# Patient Record
Sex: FEMALE | Race: WHITE | NOT HISPANIC OR LATINO | Employment: FULL TIME | URBAN - METROPOLITAN AREA
[De-identification: names, ages, dates, MRNs, and addresses within clinical notes are randomized per-mention and may not be internally consistent; named-entity substitution may affect disease eponyms.]

---

## 2017-01-03 ENCOUNTER — TELEPHONE (OUTPATIENT)
Dept: OBSTETRICS AND GYNECOLOGY | Facility: CLINIC | Age: 62
End: 2017-01-03

## 2017-01-03 NOTE — TELEPHONE ENCOUNTER
----- Message from Jany Will sent at 1/3/2017  9:43 AM EST -----  Contact: [phone]  PT CALLED [employer] ADV WAS GIVEN A DISCOUNT CARD FOR [place] - CARD  ON  DO WE BY CHANCE HAVE OTHER DISCOUNT CARDS READY TO BE USED ?      [phone] patient was requesting a new savings card for [place] because her old one  on 2016. Advised patient we do not have any savings cards or samples but she could probably go online and get one.

## 2018-12-08 ENCOUNTER — LAB REQUISITION (OUTPATIENT)
Dept: LAB | Facility: HOSPITAL | Age: 63
End: 2018-12-08

## 2018-12-08 DIAGNOSIS — Z00.00 ROUTINE GENERAL MEDICAL EXAMINATION AT A HEALTH CARE FACILITY: ICD-10-CM

## 2018-12-08 PROCEDURE — 36415 COLL VENOUS BLD VENIPUNCTURE: CPT

## 2019-10-01 ENCOUNTER — CONSULT (OUTPATIENT)
Dept: SLEEP MEDICINE | Facility: HOSPITAL | Age: 64
End: 2019-10-01

## 2019-10-01 VITALS
SYSTOLIC BLOOD PRESSURE: 145 MMHG | DIASTOLIC BLOOD PRESSURE: 67 MMHG | BODY MASS INDEX: 32.62 KG/M2 | HEART RATE: 88 BPM | WEIGHT: 195.8 LBS | OXYGEN SATURATION: 96 % | HEIGHT: 65 IN

## 2019-10-01 DIAGNOSIS — R06.83 SNORING: ICD-10-CM

## 2019-10-01 DIAGNOSIS — G47.10 HYPERSOMNOLENCE: Primary | ICD-10-CM

## 2019-10-01 DIAGNOSIS — E66.9 OBESITY (BMI 30.0-34.9): ICD-10-CM

## 2019-10-01 DIAGNOSIS — G25.81 RESTLESS LEGS SYNDROME (RLS): ICD-10-CM

## 2019-10-01 PROCEDURE — 99204 OFFICE O/P NEW MOD 45 MIN: CPT | Performed by: INTERNAL MEDICINE

## 2019-10-01 RX ORDER — PRAMIPEXOLE DIHYDROCHLORIDE 0.12 MG/1
0.12 TABLET ORAL NIGHTLY
Qty: 30 TABLET | Refills: 2 | Status: SHIPPED | OUTPATIENT
Start: 2019-10-01 | End: 2020-01-13

## 2019-10-01 RX ORDER — METFORMIN HYDROCHLORIDE 500 MG/1
1000 TABLET, EXTENDED RELEASE ORAL 2 TIMES DAILY
COMMUNITY

## 2019-10-01 RX ORDER — LEVOTHYROXINE, LIOTHYRONINE 9.5; 2.25 UG/1; UG/1
TABLET ORAL
COMMUNITY
Start: 2019-08-16 | End: 2020-01-13

## 2019-10-01 RX ORDER — ROSUVASTATIN CALCIUM 20 MG/1
TABLET, COATED ORAL
COMMUNITY

## 2019-10-01 NOTE — PROGRESS NOTES
New Sleep Patient Office Visit      Patient Name: Ian Humphrey    Referring Physician: Virginia Schuster*    Chief Complaint:    Chief Complaint   Patient presents with   • Sleeping Problem       History of Present Illness: Ian Humphrey is a 63 y.o. female who is here today to establish care with Sleep Medicine.     63-year-old female with past medical history of hypertension, diabetes, obesity, dyslipidemia and recent diagnosis of hypothyroidism presenting with complains of restless legs and difficulty sleeping at night resulting in significant daytime fatigue and sleepiness.  Patient states that she has problem with restless legs since childhood and over the years she has managed to live with that and managed to move her legs around at night so that she does not feel the impact of it but that makes her  sleep difficult and she is looking for more options now.  She has had participated in research studies for restless legs and was noted to have low ferritin levels many years ago and over the last 15 to 20 years she has been on and off iron supplementation where her ferritin levels come up above 60 but she does not notice any change in her restless leg symptoms however.  She states it bothers her pretty much every evening worse at bedtime.  She does not do much in form of exercise or stretching but we advised her for that.  Advised may be taking a warm shower at bedtime or do some lower extremity stretching to see if that will make a difference in her symptoms.  Recently apparently her ferritin level was checked and it was over 60.  She will get us the reports.    Patient does snore loudly and sometimes wake up with dry mouth or headaches.  She does have excessive sleepiness with [place] score of 19.  She stated she feels extremely sleepy and tired during the daytime and sometimes she will find herself dozing at work.  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  Denies  any driving problems or driving accidents or sleeping on the wheel or near miss accidents.  But if she is a passenger in the car she gets bothered with sleep as well as restless legs.  She avoids airplane rides because of these reasons.    Patient states that she has no energy during the daytime so she is unable to do any workout or exercise.  As a result she keeps on gaining weight and has gained about 15 pounds in the last year.  Recently she was found to have low thyroid function so she was placed on thyroid medication in the hope that will improve her energy level but so far she has not seen any significant improvement.  Patient denies any leg edema or leg pain or any neuropathic pain either.    Further sleep history is as below.    [place] Scale: 19/24    Estimated average amount of sleep per night: 7  Number of times  she wakes up at night: 3  Difficulty falling back asleep: no  It usually takes 60 minutes to go to sleep.  She feels sleepy upon waking up: yes  Rotating or night shift work: no    Drowsiness/Sleepiness:  She exhibits the following:  excessive daytime sleepiness  excessive daytime fatigue  falls asleep during times of the day when she is quiet  sleepy even while on vacation  sleepy even when sleep time is increased    Snoring/Breathing:  She exhibits the following:  loud snoring  awoken with dry mouth  awakens gasping for breath  sore throat when waking up in the morning  trouble breathing through nose at night  morning headaches    Reflux:  She describes the following:  wakes up at night with a sour taste or burning sensation in chest    Narcolepsy:  She exhibits the following:  none    RLS/PLMs:  She describes the following:  moves or jerks during sleep  discomfort in legs with an urge to move them    Insomnia:  She describes the following:  frequent awakenings  restless sleep    Parasomnia:  She exhibits the following:  sleep talks  excessive sweating while sleeping  grinds  teeth    Weight:  Weight change in the last year:  gain: 15 lbs    Subjective      Review of Systems:   Review of Systems   Constitutional: Positive for fatigue and unexpected weight gain.   HENT: Positive for ear pain and postnasal drip.    Respiratory: Negative.    Cardiovascular: Negative.    Gastrointestinal: Negative.    Endocrine: Negative.    Musculoskeletal: Positive for back pain and myalgias.   Skin: Positive for dry skin.   Neurological: Positive for headache.   Hematological: Negative.    Psychiatric/Behavioral: Negative.  Positive for depressed mood.   All other systems reviewed and are negative.      Past Medical History:   Past Medical History:   Diagnosis Date   • Arthritis    • Diabetes mellitus (CMS/HCC)    • Heart burn    • Hyperlipidemia    • Hypertension        Past Surgical History:   Past Surgical History:   Procedure Laterality Date   • EYE SURGERY      CATARACTS    • TONSILLECTOMY     • TUBAL ABDOMINAL LIGATION         Family History:   Family History   Problem Relation Age of Onset   • Coronary artery disease Mother    • Stroke Mother    • Diabetes Mother    • Heart disease Mother    • Hypertension Mother    • Obesity Mother    • Thyroid disease Mother    • Coronary artery disease Father    • Heart disease Father    • Hypertension Father    • Coronary artery disease Maternal Grandfather    • Breast cancer Paternal Grandmother         POSTMENOPAUSAL    • Diabetes Brother    • Coronary artery disease Brother    • Heart disease Brother        Social History:   Social History     Socioeconomic History   • Marital status: Single     Spouse name: Not on file   • Number of children: Not on file   • Years of education: Not on file   • Highest education level: Not on file   Tobacco Use   • Smoking status: Never Smoker   • Smokeless tobacco: Never Used   Substance and Sexual Activity   • Alcohol use: Yes     Comment: OCCAS   • Drug use: No   • Sexual activity: Not Currently     Birth  "control/protection: Post-menopausal       Medications:     Current Outpatient Medications:   •  Cholecalciferol (VITAMIN D3) 5000 UNITS capsule capsule, Take 5,000 Units by mouth Daily., Disp: , Rfl:   •  losartan-hydrochlorothiazide (HYZAAR) 100-25 MG per tablet, Take 1 tablet by mouth Daily., Disp: , Rfl:   •  MAGNESIUM PO, Take  by mouth., Disp: , Rfl:   •  melatonin 1 MG tablet, Take 5 mg by mouth., Disp: , Rfl:   •  metFORMIN ER (GLUCOPHAGE-XR) 500 MG 24 hr tablet, metformin  mg tablet,extended release 24 hr, Disp: , Rfl:   •  Misc Natural Products (FIBER 7 PO), Take  by mouth., Disp: , Rfl:   •  Multiple Vitamins-Minerals (MULTIVITAMIN ADULT PO), Take  by mouth., Disp: , Rfl:   •  NP THYROID 15 MG tablet, , Disp: , Rfl:   •  rosuvastatin (CRESTOR) 20 MG tablet, rosuvastatin 20 mg tablet, Disp: , Rfl:   •  pramipexole (MIRAPEX) 0.125 MG tablet, Take 1 tablet by mouth Every Night., Disp: 30 tablet, Rfl: 2    Allergies:   Allergies   Allergen Reactions   • Sulfa Antibiotics    • Tetracyclines & Related        Objective     Physical Exam:  Vital Signs:   Vitals:    10/01/19 1024   BP: 145/67   Pulse: 88   SpO2: 96%   Weight: 88.8 kg (195 lb 12.8 oz)   Height: 165.1 cm (65\")       Physical Exam   Constitutional: She is oriented to person, place, and time. She appears well-developed and well-nourished. No distress.   HENT:   Head: Normocephalic and atraumatic.   Nose: Nose normal.   Mouth/Throat: Oropharynx is clear and moist. No oropharyngeal exudate.   Thrush: None  Mallampati Score: 3, redundant soft palate    Eyes: EOM are normal. Pupils are equal, round, and reactive to light. Right eye exhibits no discharge. Left eye exhibits no discharge. No scleral icterus.   Neck: Neck supple. No tracheal deviation present. No thyromegaly present.   Cardiovascular: Normal rate, regular rhythm and normal heart sounds. Exam reveals no gallop and no friction rub.   No murmur heard.  Pulmonary/Chest: Effort normal and " breath sounds normal. No stridor. No respiratory distress. She has no wheezes. She has no rales.   Abdominal: Soft. She exhibits no distension. There is no tenderness.   Musculoskeletal: She exhibits no edema or tenderness.   Clubbing: none     Lymphadenopathy:     She has no cervical adenopathy.   Neurological: She is alert and oriented to person, place, and time. No cranial nerve deficit. Coordination normal.   Skin: She is not diaphoretic.   Psychiatric: She has a normal mood and affect. Her behavior is normal. Judgment and thought content normal.   Nursing note and vitals reviewed.      Results Review:   None    Assessment / Plan      Assessment:   Problem List Items Addressed This Visit     None      Visit Diagnoses     Hypersomnolence    -  Primary    Relevant Orders    Home Sleep Study    Snoring        Obesity (BMI 30.0-34.9)        Restless legs syndrome (RLS)            Plan:     1.  Patient with Significant obesity, upper airway abnormalities and postmenopausal status, concerning for underlying sleep disordered breathing with obstructive sleep apnea.  Discussed the pathophysiology of sleep apnea, side effects of untreated sleep apnea reviewed.  Treatment options reviewed as well.  Patient's  apparently has sleep apnea and was on CPAP therapy before.  We will go ahead and get a sleep study done on her.  She does get decent amount of sleep at night and hopefully we can capture the severity of her sleep apnea with home sleep study.  May consider watch Pat study to get a better idea of how much she is sleeping.  Discussed that sometimes home sleep study can miss mild sleep apnea with that case we may need to bring her in the lab to do further testing.  Patient denies any other REM behavior disorder or other phenomena which necessitates an lab study to begin with.    2.  Patient does have significant restless leg symptoms affecting her quality of life.  We discussed conservative measures to try to help  improve the restlessness in legs.  Iron stores will need to be kept above 75 ferritin level.  We discussed medications to try to treat the restless legs and did recommend pramipexole half an hour to 1 hour before bedtime at 0.125 mg dose.  Side effects of disinhibition and reckless behavior and sleep attacks reviewed with her.  If things do not improve will consider increasing the dose but will start at low-dose to begin with.  She is comfortable and agreeable with this plan.    3.  Increasing activity and weight loss counseled and its impact on underlying sleep disorders reviewed with her.  She verbalized understanding.    We will follow her after sleep study to go over the results and discuss further management options.  Thank you for allowing us to participate in the care of this patient.      Follow Up:   Follow-up after sleep study    Discussed plan of care in detail with patient today. Patient verbally understands and agrees.       Mahad Wu MD  Pulmonary Critical Care and Sleep Medicine      Please note that portions of this note may have been completed with a voice recognition program. Efforts were made to edit the dictations, but occasionally words are mistranscribed.

## 2019-10-28 ENCOUNTER — TRANSCRIBE ORDERS (OUTPATIENT)
Dept: PULMONOLOGY | Facility: CLINIC | Age: 64
End: 2019-10-28

## 2019-10-30 ENCOUNTER — HOSPITAL ENCOUNTER (OUTPATIENT)
Dept: SLEEP MEDICINE | Facility: HOSPITAL | Age: 64
Discharge: HOME | End: 2019-10-30
Admitting: INTERNAL MEDICINE

## 2019-10-30 VITALS
OXYGEN SATURATION: 97 % | DIASTOLIC BLOOD PRESSURE: 68 MMHG | HEIGHT: 65 IN | HEART RATE: 86 BPM | WEIGHT: 196.6 LBS | BODY MASS INDEX: 32.76 KG/M2 | SYSTOLIC BLOOD PRESSURE: 155 MMHG

## 2019-10-30 DIAGNOSIS — G47.10 HYPERSOMNOLENCE: ICD-10-CM

## 2019-10-30 PROCEDURE — 95800 SLP STDY UNATTENDED: CPT | Performed by: INTERNAL MEDICINE

## 2019-10-30 PROCEDURE — 95800 SLP STDY UNATTENDED: CPT

## 2019-10-31 DIAGNOSIS — R06.83 SNORING: ICD-10-CM

## 2019-10-31 DIAGNOSIS — G47.33 OSA (OBSTRUCTIVE SLEEP APNEA): Primary | ICD-10-CM

## 2019-10-31 DIAGNOSIS — E66.9 OBESITY (BMI 30.0-34.9): ICD-10-CM

## 2019-10-31 DIAGNOSIS — G47.10 HYPERSOMNOLENCE: ICD-10-CM

## 2019-10-31 RX ORDER — METOPROLOL TARTRATE 50 MG/1
50 TABLET, FILM COATED ORAL DAILY
COMMUNITY
End: 2020-01-13

## 2019-11-06 ENCOUNTER — OFFICE VISIT (OUTPATIENT)
Dept: SLEEP MEDICINE | Facility: HOSPITAL | Age: 64
End: 2019-11-06

## 2019-11-06 VITALS
HEIGHT: 65 IN | HEART RATE: 98 BPM | SYSTOLIC BLOOD PRESSURE: 149 MMHG | DIASTOLIC BLOOD PRESSURE: 62 MMHG | WEIGHT: 195 LBS | BODY MASS INDEX: 32.49 KG/M2 | OXYGEN SATURATION: 96 %

## 2019-11-06 DIAGNOSIS — G47.33 OSA (OBSTRUCTIVE SLEEP APNEA): Primary | ICD-10-CM

## 2019-11-06 PROCEDURE — 99213 OFFICE O/P EST LOW 20 MIN: CPT | Performed by: NURSE PRACTITIONER

## 2019-11-06 RX ORDER — BLOOD SUGAR DIAGNOSTIC
STRIP MISCELLANEOUS
COMMUNITY
Start: 2019-11-02

## 2019-11-06 RX ORDER — LANCETS
EACH MISCELLANEOUS
COMMUNITY
Start: 2019-10-20

## 2019-11-06 NOTE — PROGRESS NOTES
Chief Complaint:   Chief Complaint   Patient presents with   • Follow-up       HPI:    Ian Humphrey is a 63 y.o. female here for follow-up of sleep study results.  63-year-old female with past medical history of hypertension, diabetes, obesity, dyslipidemia and recent diagnosis of hypothyroidism presented with complains of restless legs and difficulty sleeping at night resulting in significant daytime fatigue and sleepiness.  Patient stated that she has problem with restless legs since childhood and over the years she has managed to live with that and managed to move her legs around at night so that she does not feel the impact of it but that makes her  sleep difficult and she is looking for more options now.  She has had participated in research studies for restless legs and was noted to have low ferritin levels many years ago and over the last 15 to 20 years she has been on and off iron supplementation during the daytime  Current Outpatient Medications:   •  ACCU-CHEK NELSON PLUS test strip, , Disp: , Rfl:   •  ACCU-CHEK SOFTCLIX LANCETS lancets, , Disp: , Rfl:   •  Cholecalciferol (VITAMIN D3) 5000 UNITS capsule capsule, Take 5,000 Units by mouth Daily., Disp: , Rfl:   •  losartan-hydrochlorothiazide (HYZAAR) 100-25 MG per tablet, Take 1 tablet by mouth Daily., Disp: , Rfl:   •  MAGNESIUM PO, Take  by mouth., Disp: , Rfl:   •  melatonin 1 MG tablet, Take 5 mg by mouth., Disp: , Rfl:   •  metFORMIN ER (GLUCOPHAGE-XR) 500 MG 24 hr tablet, metformin  mg tablet,extended release 24 hr, Disp: , Rfl:   •  metoprolol tartrate (LOPRESSOR) 50 MG tablet, Take 50 mg by mouth Daily., Disp: , Rfl:   •  Misc Natural Products (FIBER 7 PO), Take  by mouth., Disp: , Rfl:   •  Multiple Vitamins-Minerals (MULTIVITAMIN ADULT PO), Take  by mouth., Disp: , Rfl:   •  NP THYROID 15 MG tablet, , Disp: , Rfl:   •  pramipexole (MIRAPEX) 0.125 MG tablet, Take 1 tablet by mouth Every Night., Disp: 30 tablet, Rfl: 2  •   rosuvastatin (CRESTOR) 20 MG tablet, rosuvastatin 20 mg tablet, Disp: , Rfl: .      The patient's relevant past medical, surgical, family and social history were reviewed and updated in Epic as appropriate.       Review of Systems   Constitutional: Positive for fatigue.   HENT: Positive for postnasal drip.    Musculoskeletal: Positive for arthralgias, back pain and myalgias.   Neurological: Positive for headaches.   Psychiatric/Behavioral: Positive for dysphoric mood and sleep disturbance.         Objective:    Physical Exam   Constitutional: She is oriented to person, place, and time. She appears well-developed and well-nourished.   HENT:   Head: Normocephalic and atraumatic.   Mouth/Throat: Oropharynx is clear and moist.   Mallampati 3 anatomy   Eyes: Conjunctivae are normal.   Neck: Neck supple. No thyromegaly present.   Cardiovascular: Normal rate and regular rhythm.   Pulmonary/Chest: Effort normal and breath sounds normal.   Lymphadenopathy:     She has no cervical adenopathy.   Neurological: She is alert and oriented to person, place, and time.   Skin: Skin is warm and dry.   Psychiatric: She has a normal mood and affect. Her behavior is normal. Judgment and thought content normal.   Nursing note and vitals reviewed.  [place] 19/24.  Sleep study 10/30/2019 shows moderate sleep apnea increasing to severe when supine.  Patient wishes to initiate CPAP therapy.    ASSESSMENT/PLAN    Ian was seen today for follow-up.    Diagnoses and all orders for this visit:    SUKH (obstructive sleep apnea)            1. Counseled patient regarding multimodal approach with healthy nutrition, healthy sleep, regular physical activity, social activities, counseling, and medications. Encouraged to practice lateral sleep position. Avoid alcohol and sedatives close to bedtime.  2. Understands consequences of untreated sleep apnea and does wish to initiate CPAP therapy.  I will see patient back in 31 to 90 days.    I have reviewed  the results of my evaluation and impression and discussed my recommendations in detail with the patient.      Signed by  Kami Young, APRN    November 6, 2019      CC: Laci Waters MD          No ref. provider found

## 2020-01-13 ENCOUNTER — OFFICE VISIT (OUTPATIENT)
Dept: SLEEP MEDICINE | Facility: HOSPITAL | Age: 65
End: 2020-01-13

## 2020-01-13 VITALS
SYSTOLIC BLOOD PRESSURE: 157 MMHG | DIASTOLIC BLOOD PRESSURE: 70 MMHG | OXYGEN SATURATION: 96 % | WEIGHT: 188.2 LBS | BODY MASS INDEX: 31.36 KG/M2 | HEART RATE: 96 BPM | HEIGHT: 65 IN

## 2020-01-13 DIAGNOSIS — G47.33 OSA (OBSTRUCTIVE SLEEP APNEA): Primary | ICD-10-CM

## 2020-01-13 PROCEDURE — 99212 OFFICE O/P EST SF 10 MIN: CPT | Performed by: NURSE PRACTITIONER

## 2020-01-13 NOTE — PROGRESS NOTES
Chief Complaint:   Chief Complaint   Patient presents with   • Follow-up       HPI:    Ian Humphrey is a 64 y.o. female here for follow-up of sleep apnea.  Patient was last seen 11/6/2019 and did initiate CPAP therapy.  Patient has had a hard time becoming acclimated2 mask.  She is sleeping 6 to 8 hours nightly and when she is able to wear her CPAP mask she does feel better.  Patient has an [place] score of 11/24.  Patient did become ill 12/24/2019 with a sinus infection and was unable to use her CPAP for approximately 10 to 11 days.  Patient does wish to become compliant and wishes to continue.        Current medications are:   Current Outpatient Medications:   •  ACCU-CHEK NELSON PLUS test strip, , Disp: , Rfl:   •  ACCU-CHEK SOFTCLIX LANCETS lancets, , Disp: , Rfl:   •  Cholecalciferol (VITAMIN D3) 5000 UNITS capsule capsule, Take 5,000 Units by mouth 2 (Two) Times a Day., Disp: , Rfl:   •  losartan-hydrochlorothiazide (HYZAAR) 100-25 MG per tablet, Take 1 tablet by mouth Daily., Disp: , Rfl:   •  MAGNESIUM PO, Take 500 mg by mouth., Disp: , Rfl:   •  melatonin 5 MG tablet tablet, Take 5 mg by mouth., Disp: , Rfl:   •  metFORMIN ER (GLUCOPHAGE-XR) 500 MG 24 hr tablet, 2 (Two) Times a Day., Disp: , Rfl:   •  Misc Natural Products (FIBER 7 PO), Take  by mouth., Disp: , Rfl:   •  Multiple Vitamins-Minerals (MULTIVITAMIN ADULT PO), Take  by mouth., Disp: , Rfl:   •  rosuvastatin (CRESTOR) 20 MG tablet, rosuvastatin 20 mg tablet, Disp: , Rfl: .      The patient's relevant past medical, surgical, family and social history were reviewed and updated in Epic as appropriate.       Review of Systems   Constitutional: Positive for fatigue.   HENT: Positive for postnasal drip.    Musculoskeletal: Positive for arthralgias, back pain and myalgias.   Neurological: Positive for headaches.   Psychiatric/Behavioral: Positive for dysphoric mood and sleep disturbance.   All other systems reviewed and are  negative.        Objective:    Physical Exam   Constitutional: She is oriented to person, place, and time. She appears well-developed and well-nourished.   HENT:   Head: Normocephalic and atraumatic.   Mouth/Throat: Oropharynx is clear and moist.   Mallampati 3 anatomy   Eyes: Conjunctivae are normal.   Neck: Neck supple. No thyromegaly present.   Cardiovascular: Normal rate and regular rhythm.   Pulmonary/Chest: Effort normal and breath sounds normal.   Lymphadenopathy:     She has no cervical adenopathy.   Neurological: She is alert and oriented to person, place, and time.   Skin: Skin is warm and dry.   Psychiatric: She has a normal mood and affect. Her behavior is normal. Judgment and thought content normal.   Nursing note and vitals reviewed.    24/52 days of use.  Greater than 4-hour use 19.2%.  90% pressure 8.0.  AHI of 2.9.  Download reviewed with patient.    ASSESSMENT/PLAN    Pegge was seen today for follow-up.    Diagnoses and all orders for this visit:    SUKH (obstructive sleep apnea)  -     CPAP Therapy            1. Counseled patient regarding multimodal approach with healthy nutrition, healthy sleep, regular physical activity, social activities, counseling, and medications. Encouraged to practice lateral sleep position. Avoid alcohol and sedatives close to bedtime.  2.   Patient does understand the importance of wearing CPAP therapy.  Patient does wish to be compliant as she does feel better when using.  Patient to return to clinic in 8 weeks to reassess compliance.  I have reviewed the results of my evaluation and impression and discussed my recommendations in detail with the patient.      Signed by  MALIKA Munoz    January 13, 2020      CC: Laci Waters MD          No ref. provider found

## 2020-03-09 ENCOUNTER — OFFICE VISIT (OUTPATIENT)
Dept: SLEEP MEDICINE | Facility: HOSPITAL | Age: 65
End: 2020-03-09

## 2020-03-09 VITALS
HEIGHT: 65 IN | DIASTOLIC BLOOD PRESSURE: 72 MMHG | SYSTOLIC BLOOD PRESSURE: 156 MMHG | BODY MASS INDEX: 30.86 KG/M2 | OXYGEN SATURATION: 98 % | WEIGHT: 185.2 LBS | HEART RATE: 94 BPM

## 2020-03-09 DIAGNOSIS — G47.33 OSA (OBSTRUCTIVE SLEEP APNEA): Primary | ICD-10-CM

## 2020-03-09 DIAGNOSIS — G25.81 RESTLESS LEGS SYNDROME (RLS): ICD-10-CM

## 2020-03-09 PROCEDURE — 99212 OFFICE O/P EST SF 10 MIN: CPT | Performed by: NURSE PRACTITIONER

## 2020-03-09 RX ORDER — DULAGLUTIDE 0.75 MG/.5ML
INJECTION, SOLUTION SUBCUTANEOUS
COMMUNITY
Start: 2020-02-28

## 2020-03-09 NOTE — PROGRESS NOTES
Chief Complaint:   Chief Complaint   Patient presents with   • Follow-up       HPI:    Ian Humphrey is a 64 y.o. female here for follow-up of sleep apnea.  Patient was last seen 1/13/2020 and was noncompliant at that time due to illness.  Patient states she is now doing well with CPAP therapy.  Patient is sleeping 5 to 6 hours nightly and does feel refreshed upon awakening.  Patient denies excessive daytime sleepiness.  Patient has an [place] score of 13/24.  Patient wishes to continue with CPAP therapy.  Patient does have a history of restless leg syndrome.  Patient does take magnesium but this is not seem to help.  Patient will get up in the night and take 1 Advil and her symptoms will dissipate.  We have spoken today about medication to help with restless leg and patient does not wish to start at this time.        Current medications are:   Current Outpatient Medications:   •  ACCU-CHEK NELSON PLUS test strip, , Disp: , Rfl:   •  ACCU-CHEK SOFTCLIX LANCETS lancets, , Disp: , Rfl:   •  Cholecalciferol (VITAMIN D3) 5000 UNITS capsule capsule, Take 5,000 Units by mouth 2 (Two) Times a Day., Disp: , Rfl:   •  losartan-hydrochlorothiazide (HYZAAR) 100-25 MG per tablet, Take 1 tablet by mouth Daily., Disp: , Rfl:   •  MAGNESIUM PO, Take 500 mg by mouth., Disp: , Rfl:   •  melatonin 5 MG tablet tablet, Take 5 mg by mouth., Disp: , Rfl:   •  metFORMIN ER (GLUCOPHAGE-XR) 500 MG 24 hr tablet, 1,000 mg 2 (Two) Times a Day., Disp: , Rfl:   •  Misc Natural Products (FIBER 7 PO), Take  by mouth., Disp: , Rfl:   •  Multiple Vitamins-Minerals (MULTIVITAMIN ADULT PO), Take  by mouth., Disp: , Rfl:   •  rosuvastatin (CRESTOR) 20 MG tablet, rosuvastatin 20 mg tablet, Disp: , Rfl:   •  TRULICITY 0.75 MG/0.5ML solution pen-injector, , Disp: , Rfl: .      The patient's relevant past medical, surgical, family and social history were reviewed and updated in Epic as appropriate.       Review of Systems   Respiratory: Positive for  apnea.    Musculoskeletal: Positive for arthralgias, back pain and myalgias.   Neurological: Positive for headaches.   Psychiatric/Behavioral: Positive for dysphoric mood and sleep disturbance.   All other systems reviewed and are negative.        Objective:    Physical Exam   Constitutional: She is oriented to person, place, and time. She appears well-developed and well-nourished.   HENT:   Head: Normocephalic and atraumatic.   Mouth/Throat: Oropharynx is clear and moist.   Class 3 airway   Eyes: Conjunctivae are normal.   Neck: Neck supple.   Cardiovascular: Normal rate and regular rhythm.   Pulmonary/Chest: Effort normal and breath sounds normal.   Neurological: She is alert and oriented to person, place, and time.   Skin: Skin is warm and dry.   Psychiatric: She has a normal mood and affect. Her behavior is normal. Judgment and thought content normal.   Nursing note and vitals reviewed.  56/56 days of use.  Greater than 4-hour use 85.7%.  90% pressure 9.4.  AHI 3.6.  Download reviewed with patient.      ASSESSMENT/PLAN    Pegge was seen today for follow-up.    Diagnoses and all orders for this visit:    SUKH (obstructive sleep apnea)  -     CPAP Therapy    Restless legs syndrome (RLS)            1. Counseled patient regarding multimodal approach with healthy nutrition, healthy sleep, regular physical activity, social activities, counseling, and medications. Encouraged to practice l;ateral  sleep position. Avoid alcohol and sedatives close to bedtime.  2. Refill supplies x1 year.  Settings to remain 5 to 20 cm H2O.  Return to clinic 1 year or sooner symptoms warrant.  3. Patient does not wish to start medication at this time for restless leg but will let us know should she change her mind.    I have reviewed the results of my evaluation and impression and discussed my recommendations in detail with the patient.      Signed by  MALIKA Munoz    March 9, 2020      CC: Laci Waters MD          No ref.  provider found

## 2021-03-09 ENCOUNTER — OFFICE VISIT (OUTPATIENT)
Dept: SLEEP MEDICINE | Facility: HOSPITAL | Age: 66
End: 2021-03-09

## 2021-03-09 VITALS
SYSTOLIC BLOOD PRESSURE: 149 MMHG | OXYGEN SATURATION: 98 % | WEIGHT: 181 LBS | BODY MASS INDEX: 30.16 KG/M2 | HEIGHT: 65 IN | HEART RATE: 83 BPM | DIASTOLIC BLOOD PRESSURE: 68 MMHG

## 2021-03-09 DIAGNOSIS — G47.33 OSA (OBSTRUCTIVE SLEEP APNEA): Primary | ICD-10-CM

## 2021-03-09 PROCEDURE — 99212 OFFICE O/P EST SF 10 MIN: CPT | Performed by: NURSE PRACTITIONER

## 2021-03-09 NOTE — PROGRESS NOTES
Chief Complaint:   Chief Complaint   Patient presents with   • Follow-up       HPI:    Ian Humphery is a 65 y.o. female here for follow-up of sleep apnea.  Patient was last seen 3/9/2020.  Patient states she does well with CPAP therapy.  Patient is sleeping 6 hours nightly and does feel rested upon awakening.  Patient will go to sleep within 15 minutes.  Patient gets up 1-2 times a night to use the restroom and get a drink of water.  Patient does not have difficulty going back to sleep.  Patient has an McDougal score of 12/24.  We have discussed today her use being 27 out of the last 90 days.  Patient has been encouraged to increase use and reminded of consequences of untreated sleep apnea.  Patient states she will increase compliance.        Current medications are:   Current Outpatient Medications:   •  ACCU-CHEK NELSON PLUS test strip, , Disp: , Rfl:   •  ACCU-CHEK SOFTCLIX LANCETS lancets, , Disp: , Rfl:   •  Cholecalciferol (VITAMIN D3) 5000 UNITS capsule capsule, Take 5,000 Units by mouth 2 (Two) Times a Day., Disp: , Rfl:   •  losartan-hydrochlorothiazide (HYZAAR) 50-12.5 MG per tablet, Take 1 tablet by mouth Daily., Disp: , Rfl:   •  MAGNESIUM PO, Take 1,000 mg by mouth., Disp: , Rfl:   •  Melatonin 10 MG capsule, Take 20 mg by mouth., Disp: , Rfl:   •  metFORMIN ER (GLUCOPHAGE-XR) 500 MG 24 hr tablet, 1,000 mg 2 (Two) Times a Day., Disp: , Rfl:   •  Misc Natural Products (FIBER 7 PO), Take  by mouth., Disp: , Rfl:   •  Multiple Vitamins-Minerals (MULTIVITAMIN ADULT PO), Take  by mouth., Disp: , Rfl:   •  rosuvastatin (CRESTOR) 20 MG tablet, rosuvastatin 20 mg tablet, Disp: , Rfl:   •  TRULICITY 0.75 MG/0.5ML solution pen-injector, , Disp: , Rfl: .      The patient's relevant past medical, surgical, family and social history were reviewed and updated in Epic as appropriate.       Review of Systems   Respiratory: Positive for apnea.    Musculoskeletal: Positive for arthralgias, back pain and myalgias.    Neurological: Positive for headaches.   Psychiatric/Behavioral: Positive for dysphoric mood and sleep disturbance.   All other systems reviewed and are negative.        Objective:    Physical Exam  Constitutional:       Appearance: Normal appearance. She is obese.   HENT:      Head: Normocephalic and atraumatic.      Mouth/Throat:      Mouth: Mucous membranes are moist.      Pharynx: Oropharynx is clear.      Comments: Mallampati 3 anatomy  Eyes:      Conjunctiva/sclera: Conjunctivae normal.      Pupils: Pupils are equal, round, and reactive to light.   Pulmonary:      Effort: Pulmonary effort is normal. No respiratory distress.   Skin:     General: Skin is warm and dry.      Coloration: Skin is not jaundiced or pale.   Neurological:      Mental Status: She is alert and oriented to person, place, and time.   Psychiatric:         Mood and Affect: Mood normal.         Behavior: Behavior normal.         Thought Content: Thought content normal.         Judgment: Judgment normal.       27/90 days of use  Greater than 4-hour use 23.3%  90% pressure 7.7  AHI of 2.8  Settings 5-20    ASSESSMENT/PLAN    Diagnoses and all orders for this visit:    1. SUKH (obstructive sleep apnea) (Primary)  -     CPAP Therapy            1. Counseled patient regarding multimodal approach with healthy nutrition, healthy sleep, regular physical activity, social activities, counseling, and medications. Encouraged to practice lateral sleep position. Avoid alcohol and sedatives close to bedtime.  2.   Refill supplies x1 year.  Return to clinic 1 year or sooner symptoms warrant.  I have reviewed the results of my evaluation and impression and discussed my recommendations in detail with the patient.      Signed by  MALIKA Munoz    March 9, 2021      CC: Laci Waters MD          No ref. provider found

## 2021-04-12 ENCOUNTER — TRANSCRIBE ORDERS (OUTPATIENT)
Dept: ADMINISTRATIVE | Facility: HOSPITAL | Age: 66
End: 2021-04-12

## 2021-04-12 DIAGNOSIS — N95.1 MENOPAUSAL STATE: Primary | ICD-10-CM

## 2021-04-13 ENCOUNTER — TRANSCRIBE ORDERS (OUTPATIENT)
Dept: ADMINISTRATIVE | Facility: HOSPITAL | Age: 66
End: 2021-04-13

## 2021-04-13 DIAGNOSIS — N95.1 MENOPAUSAL STATE: Primary | ICD-10-CM

## 2021-07-06 ENCOUNTER — HOSPITAL ENCOUNTER (OUTPATIENT)
Dept: BONE DENSITY | Facility: HOSPITAL | Age: 66
Discharge: HOME | End: 2021-07-06

## 2021-07-06 ENCOUNTER — HOSPITAL ENCOUNTER (OUTPATIENT)
Dept: MAMMOGRAPHY | Facility: HOSPITAL | Age: 66
Discharge: HOME | End: 2021-07-06

## 2021-07-06 DIAGNOSIS — N95.1 MENOPAUSAL STATE: ICD-10-CM

## 2021-07-06 PROCEDURE — 77063 BREAST TOMOSYNTHESIS BI: CPT

## 2021-07-06 PROCEDURE — 77063 BREAST TOMOSYNTHESIS BI: CPT | Performed by: RADIOLOGY

## 2021-07-06 PROCEDURE — 77080 DXA BONE DENSITY AXIAL: CPT

## 2021-07-06 PROCEDURE — 77067 SCR MAMMO BI INCL CAD: CPT | Performed by: RADIOLOGY

## 2021-07-06 PROCEDURE — 77067 SCR MAMMO BI INCL CAD: CPT

## 2021-11-13 ENCOUNTER — HOSPITAL ENCOUNTER (EMERGENCY)
Facility: HOSPITAL | Age: 66
Discharge: HOME | End: 2021-11-13
Attending: EMERGENCY MEDICINE | Admitting: EMERGENCY MEDICINE

## 2021-11-13 ENCOUNTER — APPOINTMENT (OUTPATIENT)
Dept: CT IMAGING | Facility: HOSPITAL | Age: 66
End: 2021-11-13

## 2021-11-13 ENCOUNTER — APPOINTMENT (OUTPATIENT)
Dept: GENERAL RADIOLOGY | Facility: HOSPITAL | Age: 66
End: 2021-11-13

## 2021-11-13 VITALS
WEIGHT: 173 LBS | TEMPERATURE: 97.6 F | HEIGHT: 65 IN | SYSTOLIC BLOOD PRESSURE: 165 MMHG | RESPIRATION RATE: 15 BRPM | DIASTOLIC BLOOD PRESSURE: 74 MMHG | OXYGEN SATURATION: 94 % | HEART RATE: 79 BPM | BODY MASS INDEX: 28.82 KG/M2

## 2021-11-13 DIAGNOSIS — S06.0X1A CONCUSSION WITH LOSS OF CONSCIOUSNESS OF 30 MINUTES OR LESS, INITIAL ENCOUNTER: Primary | ICD-10-CM

## 2021-11-13 DIAGNOSIS — R51.9 ACUTE NONINTRACTABLE HEADACHE, UNSPECIFIED HEADACHE TYPE: ICD-10-CM

## 2021-11-13 DIAGNOSIS — R55 SYNCOPE AND COLLAPSE: ICD-10-CM

## 2021-11-13 LAB
ALBUMIN SERPL-MCNC: 4.5 G/DL (ref 3.5–5.2)
ALBUMIN/GLOB SERPL: 1.6 G/DL
ALP SERPL-CCNC: 100 U/L (ref 39–117)
ALT SERPL W P-5'-P-CCNC: 24 U/L (ref 1–33)
ANION GAP SERPL CALCULATED.3IONS-SCNC: 14 MMOL/L (ref 5–15)
AST SERPL-CCNC: 17 U/L (ref 1–32)
BACTERIA UR QL AUTO: ABNORMAL /HPF
BASOPHILS # BLD AUTO: 0.02 10*3/MM3 (ref 0–0.2)
BASOPHILS NFR BLD AUTO: 0.3 % (ref 0–1.5)
BILIRUB SERPL-MCNC: <0.2 MG/DL (ref 0–1.2)
BILIRUB UR QL STRIP: NEGATIVE
BUN SERPL-MCNC: 18 MG/DL (ref 8–23)
BUN/CREAT SERPL: 24.7 (ref 7–25)
CALCIUM SPEC-SCNC: 9.7 MG/DL (ref 8.6–10.5)
CHLORIDE SERPL-SCNC: 100 MMOL/L (ref 98–107)
CLARITY UR: CLEAR
CO2 SERPL-SCNC: 24 MMOL/L (ref 22–29)
COLOR UR: YELLOW
CREAT SERPL-MCNC: 0.73 MG/DL (ref 0.57–1)
D DIMER PPP FEU-MCNC: <0.27 MCGFEU/ML (ref 0–0.56)
DEPRECATED RDW RBC AUTO: 44.1 FL (ref 37–54)
EOSINOPHIL # BLD AUTO: 0.08 10*3/MM3 (ref 0–0.4)
EOSINOPHIL NFR BLD AUTO: 1.2 % (ref 0.3–6.2)
ERYTHROCYTE [DISTWIDTH] IN BLOOD BY AUTOMATED COUNT: 13.6 % (ref 12.3–15.4)
GFR SERPL CREATININE-BSD FRML MDRD: 81 ML/MIN/1.73
GLOBULIN UR ELPH-MCNC: 2.8 GM/DL
GLUCOSE SERPL-MCNC: 113 MG/DL (ref 65–99)
GLUCOSE UR STRIP-MCNC: NEGATIVE MG/DL
HCT VFR BLD AUTO: 38.6 % (ref 34–46.6)
HGB BLD-MCNC: 12.5 G/DL (ref 12–15.9)
HGB UR QL STRIP.AUTO: NEGATIVE
HOLD SPECIMEN: NORMAL
HYALINE CASTS UR QL AUTO: ABNORMAL /LPF
IMM GRANULOCYTES # BLD AUTO: 0.01 10*3/MM3 (ref 0–0.05)
IMM GRANULOCYTES NFR BLD AUTO: 0.1 % (ref 0–0.5)
KETONES UR QL STRIP: NEGATIVE
LEUKOCYTE ESTERASE UR QL STRIP.AUTO: ABNORMAL
LYMPHOCYTES # BLD AUTO: 2.82 10*3/MM3 (ref 0.7–3.1)
LYMPHOCYTES NFR BLD AUTO: 40.8 % (ref 19.6–45.3)
MAGNESIUM SERPL-MCNC: 1.9 MG/DL (ref 1.6–2.4)
MCH RBC QN AUTO: 28.4 PG (ref 26.6–33)
MCHC RBC AUTO-ENTMCNC: 32.4 G/DL (ref 31.5–35.7)
MCV RBC AUTO: 87.7 FL (ref 79–97)
MONOCYTES # BLD AUTO: 0.64 10*3/MM3 (ref 0.1–0.9)
MONOCYTES NFR BLD AUTO: 9.3 % (ref 5–12)
NEUTROPHILS NFR BLD AUTO: 3.34 10*3/MM3 (ref 1.7–7)
NEUTROPHILS NFR BLD AUTO: 48.3 % (ref 42.7–76)
NITRITE UR QL STRIP: NEGATIVE
NRBC BLD AUTO-RTO: 0 /100 WBC (ref 0–0.2)
NT-PROBNP SERPL-MCNC: 38.3 PG/ML (ref 0–900)
PH UR STRIP.AUTO: 5.5 [PH] (ref 5–8)
PLATELET # BLD AUTO: 439 10*3/MM3 (ref 140–450)
PMV BLD AUTO: 8.7 FL (ref 6–12)
POTASSIUM SERPL-SCNC: 4 MMOL/L (ref 3.5–5.2)
PROT SERPL-MCNC: 7.3 G/DL (ref 6–8.5)
PROT UR QL STRIP: NEGATIVE
RBC # BLD AUTO: 4.4 10*6/MM3 (ref 3.77–5.28)
RBC # UR: ABNORMAL /HPF
REF LAB TEST METHOD: ABNORMAL
SODIUM SERPL-SCNC: 138 MMOL/L (ref 136–145)
SP GR UR STRIP: 1.02 (ref 1–1.03)
SQUAMOUS #/AREA URNS HPF: ABNORMAL /HPF
TROPONIN T SERPL-MCNC: <0.01 NG/ML (ref 0–0.03)
UROBILINOGEN UR QL STRIP: ABNORMAL
WBC # BLD AUTO: 6.91 10*3/MM3 (ref 3.4–10.8)
WBC UR QL AUTO: ABNORMAL /HPF
WHOLE BLOOD HOLD SPECIMEN: NORMAL
WHOLE BLOOD HOLD SPECIMEN: NORMAL

## 2021-11-13 PROCEDURE — 70450 CT HEAD/BRAIN W/O DYE: CPT

## 2021-11-13 PROCEDURE — 71045 X-RAY EXAM CHEST 1 VIEW: CPT

## 2021-11-13 PROCEDURE — 87147 CULTURE TYPE IMMUNOLOGIC: CPT | Performed by: EMERGENCY MEDICINE

## 2021-11-13 PROCEDURE — 80053 COMPREHEN METABOLIC PANEL: CPT | Performed by: EMERGENCY MEDICINE

## 2021-11-13 PROCEDURE — 93005 ELECTROCARDIOGRAM TRACING: CPT | Performed by: EMERGENCY MEDICINE

## 2021-11-13 PROCEDURE — 87086 URINE CULTURE/COLONY COUNT: CPT | Performed by: EMERGENCY MEDICINE

## 2021-11-13 PROCEDURE — 99283 EMERGENCY DEPT VISIT LOW MDM: CPT

## 2021-11-13 PROCEDURE — 93005 ELECTROCARDIOGRAM TRACING: CPT

## 2021-11-13 PROCEDURE — 83735 ASSAY OF MAGNESIUM: CPT | Performed by: EMERGENCY MEDICINE

## 2021-11-13 PROCEDURE — 85025 COMPLETE CBC W/AUTO DIFF WBC: CPT

## 2021-11-13 PROCEDURE — 36415 COLL VENOUS BLD VENIPUNCTURE: CPT

## 2021-11-13 PROCEDURE — 81001 URINALYSIS AUTO W/SCOPE: CPT | Performed by: EMERGENCY MEDICINE

## 2021-11-13 PROCEDURE — 84484 ASSAY OF TROPONIN QUANT: CPT | Performed by: EMERGENCY MEDICINE

## 2021-11-13 PROCEDURE — 83880 ASSAY OF NATRIURETIC PEPTIDE: CPT | Performed by: EMERGENCY MEDICINE

## 2021-11-13 PROCEDURE — 85379 FIBRIN DEGRADATION QUANT: CPT | Performed by: EMERGENCY MEDICINE

## 2021-11-13 RX ORDER — VIT C/B6/B5/MAGNESIUM/HERB 173 50-5-6-5MG
3 CAPSULE ORAL DAILY
COMMUNITY

## 2021-11-13 RX ORDER — CALCIUM CARBONATE/VITAMIN D3 500-10/5ML
LIQUID (ML) ORAL
COMMUNITY

## 2021-11-13 RX ORDER — SODIUM CHLORIDE 0.9 % (FLUSH) 0.9 %
10 SYRINGE (ML) INJECTION AS NEEDED
Status: DISCONTINUED | OUTPATIENT
Start: 2021-11-13 | End: 2021-11-13 | Stop reason: HOSPADM

## 2021-11-13 RX ORDER — CEFDINIR 300 MG/1
300 CAPSULE ORAL 2 TIMES DAILY
Qty: 14 CAPSULE | Refills: 0 | Status: SHIPPED | OUTPATIENT
Start: 2021-11-13 | End: 2021-11-20

## 2021-11-13 RX ORDER — ROPINIROLE 0.25 MG/1
0.25 TABLET, FILM COATED ORAL
COMMUNITY
Start: 2021-11-05 | End: 2022-11-05

## 2021-11-13 NOTE — DISCHARGE INSTRUCTIONS
Take tylenol and ibuprofen as needed for headache.     Take omnicef if you develop symptoms concerning for UTI.     Return to ER with further concerns.

## 2021-11-14 LAB
BACTERIA SPEC AEROBE CULT: ABNORMAL
BACTERIA SPEC AEROBE CULT: ABNORMAL

## 2023-08-01 ENCOUNTER — TRANSCRIBE ORDERS (OUTPATIENT)
Dept: ADMINISTRATIVE | Facility: HOSPITAL | Age: 68
End: 2023-08-01
Payer: COMMERCIAL

## 2023-08-01 DIAGNOSIS — Z12.31 BREAST CANCER SCREENING BY MAMMOGRAM: Primary | ICD-10-CM

## 2023-08-25 ENCOUNTER — HOSPITAL ENCOUNTER (OUTPATIENT)
Dept: MAMMOGRAPHY | Facility: HOSPITAL | Age: 68
Discharge: HOME | End: 2023-08-25
Admitting: OBSTETRICS & GYNECOLOGY
Payer: MEDICARE

## 2023-08-25 DIAGNOSIS — Z12.31 BREAST CANCER SCREENING BY MAMMOGRAM: ICD-10-CM

## 2023-08-25 PROCEDURE — 77067 SCR MAMMO BI INCL CAD: CPT

## 2023-08-25 PROCEDURE — 77063 BREAST TOMOSYNTHESIS BI: CPT
